# Patient Record
Sex: FEMALE | Race: WHITE | URBAN - METROPOLITAN AREA
[De-identification: names, ages, dates, MRNs, and addresses within clinical notes are randomized per-mention and may not be internally consistent; named-entity substitution may affect disease eponyms.]

---

## 2017-10-27 ENCOUNTER — TRANSFERRED RECORDS (OUTPATIENT)
Dept: HEALTH INFORMATION MANAGEMENT | Facility: CLINIC | Age: 67
End: 2017-10-27

## 2017-11-17 ENCOUNTER — MEDICAL CORRESPONDENCE (OUTPATIENT)
Dept: HEALTH INFORMATION MANAGEMENT | Facility: CLINIC | Age: 67
End: 2017-11-17

## 2017-12-04 ENCOUNTER — OFFICE VISIT (OUTPATIENT)
Dept: OPHTHALMOLOGY | Facility: CLINIC | Age: 67
End: 2017-12-04

## 2017-12-04 DIAGNOSIS — C44.111 BASAL CELL CARCINOMA OF SKIN OF LEFT EYELID, INCLUDING CANTHUS: Primary | ICD-10-CM

## 2017-12-04 ASSESSMENT — VISUAL ACUITY
OD_CC: 20/20
METHOD: SNELLEN - LINEAR
CORRECTION_TYPE: GLASSES
OD_CC+: -2
OS_CC: 20/20

## 2017-12-04 ASSESSMENT — TONOMETRY
OD_IOP_MMHG: 19
IOP_METHOD: ICARE
OS_IOP_MMHG: 20

## 2017-12-04 ASSESSMENT — CONF VISUAL FIELD
OS_NORMAL: 1
OD_NORMAL: 1
METHOD: COUNTING FINGERS

## 2017-12-04 ASSESSMENT — SLIT LAMP EXAM - LIDS: COMMENTS: MGD

## 2017-12-04 NOTE — PROGRESS NOTES
Chief Complaints and History of Present Illnesses   Patient presents with     Consult For     basal cell carcinoma     Here for evaluation of left medial canthal basal cell carcinoma. The biopsy was performed in October of this year.  She has not had any scans done.  She first noticed a lesion about 1 year ago.           Katherine Gordillo is a 67 year old female with the following diagnoses:   1. Basal cell carcinoma of skin of left eyelid, including canthus      Left medial canthal basal cell carcinoma  -appears to be involving caruncle as well, plan for CT with contrast to further assess depth.      PLAN:  Left medial canthal and anterior orbital excision of lesion with frozen sections and reconstruction with amniotic membrane grafting          Yolanda Leggett MD  Ophthalmic Plastic and Reconstructive Surgery Fellow    Attending Physician Attestation:  I have seen and examined this patient.  I have confirmed and edited as necessary the chief complaint(s), history of present illness, review of systems, relevant history, and examination findings as documented by others.  I have personally reviewed the relevant tests, images, and reports as documented above.  I have confirmed and edited as necessary the assessment and plan and agree with this note.    - Isaak John MD 2:55 PM 12/4/2017    Today with Katherine Gordillo, I reviewed the indications, risks, benefits, and alternatives of the proposed surgical procedure including, but not limited to, failure obtain the desired result  and need for additional surgery, bleeding, infection, loss of vision, loss of the eye, and the remote possibility of permanent damage to any organ system or death with the use of anesthesia.  I provided multiple opportunities for the questions, answered all questions to the best of my ability, and confirmed that my answers and my discussion were understood.   - Isaak John MD 3:18 PM 12/4/2017

## 2017-12-04 NOTE — PATIENT INSTRUCTIONS
Eyelid Tumors    The eyelid skin is the thinnest and most sensitive skin on your body. As a result, this is often the first area on your face to show change from sun damage and aging. Unfortunately, sun damage and other environmental toxins not only cause the skin to age but can cause serious damage. Skin cancer of the eyelids is relatively common and several types exist. The presence of a nodule or lesion on the eyelid that grows, bleed or ulcerates should be evaluated. This involves examination and sometimes a biopsy.    Basal Cell Carcinoma  Basal cell tumors represent the ninety percent of eyelid tumors. These skin cancers grow slowly over months and years. They most often appear as a pearly nodule that eventually starts to break down and ulcerate. Despite being a cancer, these tumors don't spread to distant areas but rather just continue to grow and infiltrate the surrounding tissue. They typically can be cured by simple excision followed by reconstruction of the defect left behind after the tumor removal.      Squamous Cell Carcinoma and Melanoma   These types of tumors occur much less common but are more aggressive and require more involved care to ensure complete treatment. Again, primary treatment involves removing the tumor, but care must also be taken to ensure the tumor has not spread anywhere, causing larger health problems. Your surgeon will help coordinate this as part of your treatment depending on the size and circumstances of the tumor at presentation.     Treatment     Skin cancer needs to be removed surgically by a skilled individual who can not only remove the tumor but reconstruct the eyelid or area where the tumor was removed. Sometimes, this will be done at a surgical facility with an on site pathologist who can immediately examine the specimen to ensure the whole tumor was removed. Other times, the help of a dermatologic surgeon specializing in Mohs surgical excision will be utilized. This  procedure is completed into two steps, the first in the dermatologist's office with immediate examination of the tumor to ensure its complete removal followed by the reconstructive surgery by Dr. John. Oculoplastic surgeons are the optimal person to repair these defects as the eyelid is quite delicate. Dr. John is a member of [employer] and is well-versed in the intricacies of the eyelid anatomy and the pitfalls associated with reconstructing this area.

## 2017-12-04 NOTE — LETTER
2017         RE:  :  MRN: Katherine Gordillo  1950  [id number]     Dear ,    Thank you for asking me to see your patient, Katherine Gordillo, for an oculoplastic   consultation.  My assessment and plan are below.  For further details, please see my attached clinic note.          Katherine Gordillo is a 67 year old female with the following diagnoses:   1. Basal cell carcinoma of skin of left eyelid, including canthus      Left medial canthal basal cell carcinoma  -appears to be involving caruncle as well, plan for CT with contrast to further assess depth.      PLAN:  Left medial canthal and anterior orbital excision of lesion with frozen sections and reconstruction with amniotic membrane grafting         Again, thank you for allowing me to participate in the care of your patient.      Sincerely,    Isaak John MD  Department of Ophthalmology and Visual Neurosciences  [facility]    CC: Whit Blue PA-C  [facility]  [address]  [place] [address]  VIA Facsimile: [phone]     ALIRIO PUTNAM  [facility]  [address]  [place] [address]  VIA Mail

## 2017-12-04 NOTE — NURSING NOTE
Chief Complaints and History of Present Illnesses   Patient presents with     Consult For     basal cell carcinoma     HPI    Affected eye(s):  Left   Symptoms:     No blurred vision   Tearing   Itching      Frequency:  Constant       Do you have eye pain now?:  No      Comments:  Pt states had a biopsy on a blood vein from left medial canthus that showed basal cell carcinoma. No pain. Brimonidine TID OU, Genteal TID OU. Ointment QHS OU.    Belem Combs COT 2:12 PM December 4, 2017

## 2017-12-04 NOTE — MR AVS SNAPSHOT
After Visit Summary   12/4/2017    Katherine Gordillo    MRN: [id number]           Patient Information     Date Of Birth          1950        Visit Information        Provider Department      12/4/2017 2:30 PM Isaak John MD [facility] Ophthalmology        Today's Diagnoses     Basal cell carcinoma of skin of left eyelid, including canthus    -  1      Care Instructions    Eyelid Tumors    The eyelid skin is the thinnest and most sensitive skin on your body. As a result, this is often the first area on your face to show change from sun damage and aging. Unfortunately, sun damage and other environmental toxins not only cause the skin to age but can cause serious damage. Skin cancer of the eyelids is relatively common and several types exist. The presence of a nodule or lesion on the eyelid that grows, bleed or ulcerates should be evaluated. This involves examination and sometimes a biopsy.    Basal Cell Carcinoma  Basal cell tumors represent the ninety percent of eyelid tumors. These skin cancers grow slowly over months and years. They most often appear as a pearly nodule that eventually starts to break down and ulcerate. Despite being a cancer, these tumors don't spread to distant areas but rather just continue to grow and infiltrate the surrounding tissue. They typically can be cured by simple excision followed by reconstruction of the defect left behind after the tumor removal.      Squamous Cell Carcinoma and Melanoma   These types of tumors occur much less common but are more aggressive and require more involved care to ensure complete treatment. Again, primary treatment involves removing the tumor, but care must also be taken to ensure the tumor has not spread anywhere, causing larger health problems. Your surgeon will help coordinate this as part of your treatment depending on the size and circumstances of the tumor at presentation.     Treatment     Skin cancer needs to be removed surgically  by a skilled individual who can not only remove the tumor but reconstruct the eyelid or area where the tumor was removed. Sometimes, this will be done at a surgical facility with an on site pathologist who can immediately examine the specimen to ensure the whole tumor was removed. Other times, the help of a dermatologic surgeon specializing in Mohs surgical excision will be utilized. This procedure is completed into two steps, the first in the dermatologist's office with immediate examination of the tumor to ensure its complete removal followed by the reconstructive surgery by Dr. John. Oculoplastic surgeons are the optimal person to repair these defects as the eyelid is quite delicate. Dr. John is a member of [employer] and is well-versed in the intricacies of the eyelid anatomy and the pitfalls associated with reconstructing this area.              Follow-ups after your visit        Your next 10 appointments already scheduled     Dec 04, 2017  4:40 PM CST   (Arrive by 4:25 PM)   CT TEMPORAL ORBITAL SELLA W CONTRAST with UCCT1   [facility] [place] [facility] ([facility])    [address] [address] [phone]           Please bring any scans or X-rays taken at other hospitals, if similar tests were done. Also bring a list of your medicines, including vitamins, minerals and over-the-counter drugs. It is safest to leave personal items at home.  Be sure to tell your doctor:   If you have any allergies.   If there s any chance you are pregnant.   If you are breastfeeding.   If you have any special needs.  You will have contrast for this exam. To prepare:   Do not eat or drink for 2 hours before your exam. If you need to take medicine, you may take it with small sips of water. (We may ask you to take liquid medicine as well.)   The day before your exam, drink extra fluids at least six 8-ounce glasses (unless your doctor tells you to restrict your fluids).   Patients over 70 or patients with diabetes or kidney problems:   If you haven t had a blood test (creatinine test) within the last 30 days, go to your clinic or Diagnostic Imaging Department for this test.  If you have diabetes:   If your kidney function is normal, continue taking your metformin (Avandamet, Glucophage, Glucovance, Metaglip) on the day of your exam.   If your kidney function is abnormal, wait 48 hours before restarting this medicine.  Please wear loose clothing, such as a sweat suit or jogging clothes. Avoid snaps, zippers and other metal. We may ask you to undress and put on a hospital gown.  If you have any questions, please call the Imaging Department where you will have your exam.              Future tests that were ordered for you today     Open Future Orders        Priority Expected Expires Ordered    CT Temporal Orbital Sella w Contrast Routine  12/4/2018 12/4/2017            Who to contact     Please call your clinic at [phone] to:    Ask questions about your health    Make or cancel appointments    Discuss your medicines    Learn about your test results    Speak to your doctor   If you have compliments or concerns about an experience at your clinic, or if you wish to file a complaint, please contact [facility] Patient Relations at [phone] or email us at [email or web address]         Additional Information About Your Visit        [employer] Information     [employer] gives you secure access to your electronic health record. If you see a primary care provider, you can also send messages to your care team and make appointments. If you have questions, please call your primary care clinic.  If you do not have a primary care provider, please call [phone] and they will assist you.      [employer] is an electronic gateway that provides easy, online access to your medical records. With [employer], you can request a clinic appointment, read your test results,  renew a prescription or communicate with your care team.     To access your existing account, please contact your [facility] or call [phone] for assistance.        Care EveryWhere ID     This is your Care EveryWhere ID. This could be used by other organizations to access your [facility] medical records  [id number]         Blood Pressure from Last 3 Encounters:   No data found for BP    Weight from Last 3 Encounters:   No data found for Wt              We Performed the Following     External Photos OU (both eyes)     Yahaira-Operative Worksheet (Plastics)        Primary Care Provider Office Phone # Fax #    Whit MELODY Blue PA-C [phone]       [facility] [address]  [place] [address]        Equal Access to Services     CELIA BETANCOURT : Hadii aad ku hadasho Soomaali, waaxda luqadaha, qaybta kaalmada adeegyada, waxay idiin hayveen alvaro rojas . So [facility] [phone].    ATENCIÓN: Si habla español, tiene a morton disposición servicios gratuitos de asistencia lingüística. [facility] [phone].    We comply with applicable federal civil rights laws and [place] laws. We do not discriminate on the basis of race, color, national origin, age, disability, sex, sexual orientation, or gender identity.            Thank you!     Thank you for choosing [facility] OPHTHALMOLOGY  for your care. Our goal is always to provide you with excellent care. Hearing back from our patients is one way we can continue to improve our services. Please take a few minutes to complete the written survey that you may receive in the mail after your visit with us. Thank you!             Your Updated Medication List - Protect others around you: Learn how to safely use, store and throw away your medicines at www.disposemymeds.org.          This list is accurate as of: 12/4/17  3:34 PM.  Always use your most recent med list.                   Brand Name Dispense Instructions for use  Diagnosis    BRIMONIDINE TARTRATE OP      Apply 1 drop to eye 3 times daily        TUMS PO      Take by mouth as needed        TYLENOL PO      Take by mouth as needed

## 2018-01-04 ENCOUNTER — TRANSFERRED RECORDS (OUTPATIENT)
Dept: HEALTH INFORMATION MANAGEMENT | Facility: CLINIC | Age: 68
End: 2018-01-04

## 2018-01-30 ENCOUNTER — ANESTHESIA EVENT (OUTPATIENT)
Dept: SURGERY | Facility: AMBULATORY SURGERY CENTER | Age: 68
End: 2018-01-30

## 2018-01-30 NOTE — ANESTHESIA PREPROCEDURE EVALUATION
Physical Exam  Normal systems: cardiovascular and pulmonary    Airway   Mallampati: II  TM distance: >3 FB  Neck ROM: full    Dental   (+) upper dentures and lower dentures    Cardiovascular   Rhythm and rate: regular and normal      Pulmonary    breath sounds clear to auscultation                    Anesthesia Plan      History & Physical Review  History and physical reviewed and following examination; no interval change.    ASA Status:  2 .    NPO Status:  > 8 hours    Plan for General and LMA with Propofol and Intravenous induction. Maintenance will be Balanced.    PONV prophylaxis:  Ondansetron (or other 5HT-3) and Dexamethasone or Solumedrol       Postoperative Care  Postoperative pain management:  IV analgesics, Oral pain medications and Multi-modal analgesia.      Consents  Anesthetic plan, risks, benefits and alternatives discussed with:  Patient..            ANESTHESIA PREOP EVALUATION    PROCEDURE: Procedure(s):  Left Medial Canthal and Anterior Orbital Excision of Lesion with Frozen Sections and Reconstruction with Amniotic Membrane Grafting - Wound Class:     HPI: Katherine Gordillo is a 67 year old female who presents for above procedure.    PAST MEDICAL HISTORY:    Past Medical History:   Diagnosis Date     Diabetes (H)        PAST SURGICAL HISTORY:    No past surgical history on file.    PAST ANESTHESIA HISTORY:     No personal or family h/o anesthesia problems    SOCIAL HISTORY:       Social History   Substance Use Topics     Smoking status: Never Smoker     Smokeless tobacco: Never Used     Alcohol use Not on file       ALLERGIES:     No Known Allergies    MEDICATIONS:       (Not in a hospital admission)    Current Outpatient Prescriptions   Medication Sig Dispense Refill     METFORMIN HCL PO Take 500 mg by mouth       VITAMIN D, CHOLECALCIFEROL, PO Take by mouth daily       SIMVASTATIN PO        Multiple Vitamins-Minerals (MULTIVITAMIN ADULT PO)        BRIMONIDINE TARTRATE OP Apply 1 drop to eye  3 times daily       Acetaminophen (TYLENOL PO) Take by mouth as needed       Calcium Carbonate Antacid (TUMS PO) Take by mouth as needed         Current Outpatient Prescriptions Ordered in [facility]   Medication Sig Dispense Refill     METFORMIN HCL PO Take 500 mg by mouth       VITAMIN D, CHOLECALCIFEROL, PO Take by mouth daily       SIMVASTATIN PO        Multiple Vitamins-Minerals (MULTIVITAMIN ADULT PO)        BRIMONIDINE TARTRATE OP Apply 1 drop to eye 3 times daily       Acetaminophen (TYLENOL PO) Take by mouth as needed       Calcium Carbonate Antacid (TUMS PO) Take by mouth as needed       No current Epic-ordered facility-administered medications on file.        PHYSICAL EXAM:    Vitals: T Data Unavailable, P Data Unavailable, BP Data Unavailable, R Data Unavailable, SpO2  , Weight Wt Readings from Last 2 Encounters:   No data found for Wt       See doc flowsheet      No Data Recorded    NPO STATUS: see doc flowsheet    LABS:    BMP:  No results for input(s): NA, POTASSIUM, CHLORIDE, CO2, BUN, CR, GLC, YAMILETH in the last 66380 hours.    LFTs:   No results for input(s): PROTTOTAL, ALBUMIN, BILITOTAL, ALKPHOS, AST, ALT, BILIDIRECT in the last 75444 hours.    CBC:   No results for input(s): WBC, RBC, HGB, HCT, MCV, MCH, MCHC, RDW, PLT in the last 65737 hours.    Coags:  No results for input(s): INR, PTT, FIBR in the last 65514 hours.    Imaging:  No orders to display       Dariel Hoyt MD  Anesthesiology Staff  Pager [phone]    1/30/2018  1:58 PM                  .

## 2018-01-31 ENCOUNTER — ANESTHESIA (OUTPATIENT)
Dept: SURGERY | Facility: AMBULATORY SURGERY CENTER | Age: 68
End: 2018-01-31

## 2018-01-31 ENCOUNTER — HOSPITAL ENCOUNTER (OUTPATIENT)
Facility: AMBULATORY SURGERY CENTER | Age: 68
End: 2018-01-31
Attending: OPHTHALMOLOGY
Payer: MEDICARE

## 2018-01-31 VITALS
OXYGEN SATURATION: 96 % | SYSTOLIC BLOOD PRESSURE: 110 MMHG | DIASTOLIC BLOOD PRESSURE: 72 MMHG | WEIGHT: 135 LBS | TEMPERATURE: 97.6 F | BODY MASS INDEX: 26.5 KG/M2 | HEART RATE: 92 BPM | RESPIRATION RATE: 18 BRPM | HEIGHT: 60 IN

## 2018-01-31 DIAGNOSIS — Z98.890 POSTOPERATIVE EYE STATE: Primary | ICD-10-CM

## 2018-01-31 LAB
GLUCOSE BLDC GLUCOMTR-MCNC: 121 MG/DL (ref 70–99)
GLUCOSE BLDC GLUCOMTR-MCNC: 136 MG/DL (ref 70–99)

## 2018-01-31 PROCEDURE — 88331 PATH CONSLTJ SURG 1 BLK 1SPC: CPT | Performed by: OPHTHALMOLOGY

## 2018-01-31 PROCEDURE — 88307 TISSUE EXAM BY PATHOLOGIST: CPT | Performed by: OPHTHALMOLOGY

## 2018-01-31 DEVICE — EYE IMP AMNIOTIC MEMBRANE 2.0X1.5CM AG-2015: Type: IMPLANTABLE DEVICE | Site: EYE | Status: FUNCTIONAL

## 2018-01-31 RX ORDER — GLYCOPYRROLATE 0.2 MG/ML
INJECTION, SOLUTION INTRAMUSCULAR; INTRAVENOUS PRN
Status: DISCONTINUED | OUTPATIENT
Start: 2018-01-31 | End: 2018-01-31

## 2018-01-31 RX ORDER — MEPERIDINE HYDROCHLORIDE 25 MG/ML
12.5 INJECTION INTRAMUSCULAR; INTRAVENOUS; SUBCUTANEOUS
Status: DISCONTINUED | OUTPATIENT
Start: 2018-01-31 | End: 2018-02-01 | Stop reason: HOSPADM

## 2018-01-31 RX ORDER — LIDOCAINE 40 MG/G
CREAM TOPICAL
Status: DISCONTINUED | OUTPATIENT
Start: 2018-01-31 | End: 2018-01-31 | Stop reason: HOSPADM

## 2018-01-31 RX ORDER — EPHEDRINE SULFATE 50 MG/ML
INJECTION, SOLUTION INTRAMUSCULAR; INTRAVENOUS; SUBCUTANEOUS PRN
Status: DISCONTINUED | OUTPATIENT
Start: 2018-01-31 | End: 2018-01-31

## 2018-01-31 RX ORDER — FENTANYL CITRATE 50 UG/ML
25-50 INJECTION, SOLUTION INTRAMUSCULAR; INTRAVENOUS
Status: DISCONTINUED | OUTPATIENT
Start: 2018-01-31 | End: 2018-01-31 | Stop reason: HOSPADM

## 2018-01-31 RX ORDER — LIDOCAINE HYDROCHLORIDE 20 MG/ML
INJECTION, SOLUTION INFILTRATION; PERINEURAL PRN
Status: DISCONTINUED | OUTPATIENT
Start: 2018-01-31 | End: 2018-01-31

## 2018-01-31 RX ORDER — FENTANYL CITRATE 50 UG/ML
INJECTION, SOLUTION INTRAMUSCULAR; INTRAVENOUS PRN
Status: DISCONTINUED | OUTPATIENT
Start: 2018-01-31 | End: 2018-01-31

## 2018-01-31 RX ORDER — NEOMYCIN SULFATE, POLYMYXIN B SULFATE AND DEXAMETHASONE 3.5; 10000; 1 MG/ML; [USP'U]/ML; MG/ML
1 SUSPENSION/ DROPS OPHTHALMIC 4 TIMES DAILY
Qty: 1 BOTTLE | Refills: 0 | Status: SHIPPED | OUTPATIENT
Start: 2018-01-31

## 2018-01-31 RX ORDER — FENTANYL CITRATE 50 UG/ML
25-50 INJECTION, SOLUTION INTRAMUSCULAR; INTRAVENOUS
Status: DISCONTINUED | OUTPATIENT
Start: 2018-01-31 | End: 2018-02-01 | Stop reason: HOSPADM

## 2018-01-31 RX ORDER — PROPOFOL 10 MG/ML
INJECTION, EMULSION INTRAVENOUS PRN
Status: DISCONTINUED | OUTPATIENT
Start: 2018-01-31 | End: 2018-01-31

## 2018-01-31 RX ORDER — LIDOCAINE HYDROCHLORIDE AND EPINEPHRINE 10; 10 MG/ML; UG/ML
INJECTION, SOLUTION INFILTRATION; PERINEURAL PRN
Status: DISCONTINUED | OUTPATIENT
Start: 2018-01-31 | End: 2018-01-31 | Stop reason: HOSPADM

## 2018-01-31 RX ORDER — SODIUM CHLORIDE, SODIUM LACTATE, POTASSIUM CHLORIDE, CALCIUM CHLORIDE 600; 310; 30; 20 MG/100ML; MG/100ML; MG/100ML; MG/100ML
INJECTION, SOLUTION INTRAVENOUS CONTINUOUS
Status: DISCONTINUED | OUTPATIENT
Start: 2018-01-31 | End: 2018-02-01 | Stop reason: HOSPADM

## 2018-01-31 RX ORDER — NALOXONE HYDROCHLORIDE 0.4 MG/ML
.1-.4 INJECTION, SOLUTION INTRAMUSCULAR; INTRAVENOUS; SUBCUTANEOUS
Status: DISCONTINUED | OUTPATIENT
Start: 2018-01-31 | End: 2018-02-01 | Stop reason: HOSPADM

## 2018-01-31 RX ORDER — ONDANSETRON 2 MG/ML
INJECTION INTRAMUSCULAR; INTRAVENOUS PRN
Status: DISCONTINUED | OUTPATIENT
Start: 2018-01-31 | End: 2018-01-31

## 2018-01-31 RX ORDER — SODIUM CHLORIDE, SODIUM LACTATE, POTASSIUM CHLORIDE, CALCIUM CHLORIDE 600; 310; 30; 20 MG/100ML; MG/100ML; MG/100ML; MG/100ML
500 INJECTION, SOLUTION INTRAVENOUS CONTINUOUS
Status: DISCONTINUED | OUTPATIENT
Start: 2018-01-31 | End: 2018-01-31 | Stop reason: HOSPADM

## 2018-01-31 RX ORDER — HYDROMORPHONE HYDROCHLORIDE 1 MG/ML
.3-.5 INJECTION, SOLUTION INTRAMUSCULAR; INTRAVENOUS; SUBCUTANEOUS EVERY 10 MIN PRN
Status: DISCONTINUED | OUTPATIENT
Start: 2018-01-31 | End: 2018-02-01 | Stop reason: HOSPADM

## 2018-01-31 RX ORDER — ERYTHROMYCIN 5 MG/G
OINTMENT OPHTHALMIC PRN
Status: DISCONTINUED | OUTPATIENT
Start: 2018-01-31 | End: 2018-01-31 | Stop reason: HOSPADM

## 2018-01-31 RX ORDER — HYDROCODONE BITARTRATE AND ACETAMINOPHEN 5; 325 MG/1; MG/1
1 TABLET ORAL EVERY 6 HOURS PRN
Qty: 10 TABLET | Refills: 0 | Status: SHIPPED | OUTPATIENT
Start: 2018-01-31

## 2018-01-31 RX ORDER — PROPOFOL 10 MG/ML
INJECTION, EMULSION INTRAVENOUS CONTINUOUS PRN
Status: DISCONTINUED | OUTPATIENT
Start: 2018-01-31 | End: 2018-01-31

## 2018-01-31 RX ORDER — ACETAMINOPHEN 325 MG/1
975 TABLET ORAL ONCE
Status: COMPLETED | OUTPATIENT
Start: 2018-01-31 | End: 2018-01-31

## 2018-01-31 RX ORDER — DEXAMETHASONE SODIUM PHOSPHATE 4 MG/ML
INJECTION, SOLUTION INTRA-ARTICULAR; INTRALESIONAL; INTRAMUSCULAR; INTRAVENOUS; SOFT TISSUE PRN
Status: DISCONTINUED | OUTPATIENT
Start: 2018-01-31 | End: 2018-01-31

## 2018-01-31 RX ORDER — ONDANSETRON 2 MG/ML
4 INJECTION INTRAMUSCULAR; INTRAVENOUS EVERY 30 MIN PRN
Status: DISCONTINUED | OUTPATIENT
Start: 2018-01-31 | End: 2018-02-01 | Stop reason: HOSPADM

## 2018-01-31 RX ORDER — PHENYLEPHRINE HYDROCHLORIDE 100 MG/ML
SOLUTION/ DROPS OPHTHALMIC PRN
Status: DISCONTINUED | OUTPATIENT
Start: 2018-01-31 | End: 2018-01-31 | Stop reason: HOSPADM

## 2018-01-31 RX ORDER — ONDANSETRON 4 MG/1
4 TABLET, ORALLY DISINTEGRATING ORAL EVERY 30 MIN PRN
Status: DISCONTINUED | OUTPATIENT
Start: 2018-01-31 | End: 2018-02-01 | Stop reason: HOSPADM

## 2018-01-31 RX ORDER — ERYTHROMYCIN 5 MG/G
OINTMENT OPHTHALMIC
Qty: 3.5 G | Refills: 0 | Status: SHIPPED | OUTPATIENT
Start: 2018-01-31

## 2018-01-31 RX ADMIN — PROPOFOL 150 MCG/KG/MIN: 10 INJECTION, EMULSION INTRAVENOUS at 09:22

## 2018-01-31 RX ADMIN — Medication 100 MCG: at 10:30

## 2018-01-31 RX ADMIN — GLYCOPYRROLATE 0.2 MG: 0.2 INJECTION, SOLUTION INTRAMUSCULAR; INTRAVENOUS at 09:59

## 2018-01-31 RX ADMIN — Medication 100 MCG: at 10:39

## 2018-01-31 RX ADMIN — PROPOFOL 40 MG: 10 INJECTION, EMULSION INTRAVENOUS at 09:22

## 2018-01-31 RX ADMIN — SODIUM CHLORIDE, SODIUM LACTATE, POTASSIUM CHLORIDE, CALCIUM CHLORIDE: 600; 310; 30; 20 INJECTION, SOLUTION INTRAVENOUS at 09:14

## 2018-01-31 RX ADMIN — FENTANYL CITRATE 50 MCG: 50 INJECTION, SOLUTION INTRAMUSCULAR; INTRAVENOUS at 09:30

## 2018-01-31 RX ADMIN — Medication 100 MCG: at 10:28

## 2018-01-31 RX ADMIN — EPHEDRINE SULFATE 5 MG: 50 INJECTION, SOLUTION INTRAMUSCULAR; INTRAVENOUS; SUBCUTANEOUS at 10:45

## 2018-01-31 RX ADMIN — PROPOFOL: 10 INJECTION, EMULSION INTRAVENOUS at 10:20

## 2018-01-31 RX ADMIN — EPHEDRINE SULFATE 5 MG: 50 INJECTION, SOLUTION INTRAMUSCULAR; INTRAVENOUS; SUBCUTANEOUS at 09:44

## 2018-01-31 RX ADMIN — Medication 200 MCG: at 11:26

## 2018-01-31 RX ADMIN — Medication 200 MCG: at 11:16

## 2018-01-31 RX ADMIN — Medication 100 MCG: at 10:45

## 2018-01-31 RX ADMIN — PROPOFOL 160 MG: 10 INJECTION, EMULSION INTRAVENOUS at 09:21

## 2018-01-31 RX ADMIN — PROPOFOL 100 MCG/KG/MIN: 10 INJECTION, EMULSION INTRAVENOUS at 11:03

## 2018-01-31 RX ADMIN — Medication 100 MCG: at 09:36

## 2018-01-31 RX ADMIN — ONDANSETRON 4 MG: 2 INJECTION INTRAMUSCULAR; INTRAVENOUS at 09:29

## 2018-01-31 RX ADMIN — Medication 100 MCG: at 11:05

## 2018-01-31 RX ADMIN — Medication 100 MCG: at 10:54

## 2018-01-31 RX ADMIN — EPHEDRINE SULFATE 5 MG: 50 INJECTION, SOLUTION INTRAMUSCULAR; INTRAVENOUS; SUBCUTANEOUS at 10:54

## 2018-01-31 RX ADMIN — Medication 100 MCG: at 10:34

## 2018-01-31 RX ADMIN — Medication 100 MCG: at 09:44

## 2018-01-31 RX ADMIN — EPHEDRINE SULFATE 5 MG: 50 INJECTION, SOLUTION INTRAMUSCULAR; INTRAVENOUS; SUBCUTANEOUS at 11:05

## 2018-01-31 RX ADMIN — LIDOCAINE HYDROCHLORIDE 60 MG: 20 INJECTION, SOLUTION INFILTRATION; PERINEURAL at 09:21

## 2018-01-31 RX ADMIN — ACETAMINOPHEN 975 MG: 325 TABLET ORAL at 07:34

## 2018-01-31 RX ADMIN — EPHEDRINE SULFATE 5 MG: 50 INJECTION, SOLUTION INTRAMUSCULAR; INTRAVENOUS; SUBCUTANEOUS at 11:16

## 2018-01-31 RX ADMIN — DEXAMETHASONE SODIUM PHOSPHATE 4 MG: 4 INJECTION, SOLUTION INTRA-ARTICULAR; INTRALESIONAL; INTRAMUSCULAR; INTRAVENOUS; SOFT TISSUE at 09:29

## 2018-01-31 NOTE — OP NOTE
Procedure Date: 01/31/2018      DATE OF PROCEDURE:  01/31/2018      PREOPERATIVE DIAGNOSIS:  Left medial canthal and anterior orbital mass.      POSTOPERATIVE DIAGNOSIS:  Left medial canthal and anterior orbital mass.      PROCEDURE PERFORMED:   1.  Left orbitotomy with excision of lesion.   2.  Left amniotic membrane graft.   3.  Left medial canthal reconstruction with adjacent tissue transfer.      SURGEON:  Isaak John MD      ASSISTANT:  Yolanda Leggett MD      ANESTHESIA:  General with local infiltration of 1% lidocaine with epinephrine.      COMPLICATIONS:  None.      ESTIMATED BLOOD LOSS:  Less than 5 mL.      SPECIMENS:  Left orbital lesion in formalin and left deep margin, frozen.      HISTORY AND INDICATIONS:  The patient presented with a biopsy proven basal cell carcinoma of left medial canthus.  Orbital imaging revealed anterior extension into the orbit adjacent to the medial rectus muscle.  After risks, benefits and alternatives to the proposed procedure were explained, informed consent was obtained.      DESCRIPTION OF PROCEDURE:  The patient was brought to the operating room and placed supine on the operating table.  General anesthesia was induced.  The area was infiltrated with local anesthetic.  She was prepped and draped in the typical sterile fashion for oculoplastic surgery.  Attention was directed to the left side.  Markings were made on the medial canthus surrounding the area of the nodular lesion.  Incision was made with a #15 blade on the skin.  This was then carried full thickness through the eyelids.  Hemostasis was obtained with monopolar cautery.  The conjunctiva portion of the lesion was then excised using Aviva scissors to remain outside the clearly defined clinical margins.  Hemostasis was obtained.  Dissection was carried into the anterior orbit using the Aviva scissors along the medial rectus muscle.  There was no involvement of the rectus itself.  However, it did extend  into the rectus sheath anteriorly.  Biopsy of the deep tissues was sent for frozen section and found to be negative.  The remainder of the lesion was then excised with Aviva scissors and was placed in formalin with a suture marking the medial canthal angle.  Hemostasis was again obtained.  Using amniotic membrane, the conjunctiva was reconstructed.  The membrane was placed over the palpebral and bulbar conjunctiva and secured in place with interrupted 8-0 Vicryl sutures.  The upper and lower eyelids were then secured to the medial canthal tendon after releasing the lateral canthus with a #15 blade and doing the lateral canthotomy with monopolar cautery.  This allowed us to close the lids to the posterior lacrimal crest.  The anterior skin defect was then closed with anterior advancement flaps, which were secured with interrupted buried 5-0 Vicryl sutures deep and 6-0 plain gut sutures on the skin.  Lateral canthal tissue was closed with deep buried 5-0 Vicryl sutures and 6-0 plain gut sutures on the skin.  Antibiotic ointment was applied to the incisions in the eye.  The patient tolerated the procedure well.  She was taken to recovery room in stable condition.         SHIRLEY PENA MD             D: 2018   T: 2018   MT: gilmer      Name:     JUAN ANTONIO BARNARD   MRN:      [id number]        Account:        [id number]   :      1950           Procedure Date: 2018      Document: [id number]

## 2018-01-31 NOTE — ANESTHESIA POSTPROCEDURE EVALUATION
Patient: Katherine L Wilz    Procedure(s):  Left Medial Canthal and Anterior Orbital Excision of Lesion with Frozen Sections and Reconstruction with Amniotic Membrane Grafting - Wound Class: I-Clean    Diagnosis:Basal Cell Carcinoma  Diagnosis Additional Information: No value filed.    Anesthesia Type:  General, LMA    Note:  Anesthesia Post Evaluation    Patient location during evaluation: PACU  Patient participation: Able to fully participate in evaluation  Level of consciousness: awake and alert  Pain management: adequate  Airway patency: patent  Cardiovascular status: acceptable  Respiratory status: acceptable  Hydration status: acceptable  PONV: none     Anesthetic complications: None          Last vitals:  Vitals:    01/31/18 1200 01/31/18 1215 01/31/18 1230   BP: 93/65 109/75 110/72   Pulse:      Resp: 19 18 18   Temp: 36.5  C (97.7  F)  36.4  C (97.6  F)   SpO2: 96% 96% 96%         Electronically Signed By: Dariel Hoyt MD  January 31st, 2018

## 2018-01-31 NOTE — ANESTHESIA CARE TRANSFER NOTE
Patient: Katherine L Wilz    Procedure(s):  Left Medial Canthal and Anterior Orbital Excision of Lesion with Frozen Sections and Reconstruction with Amniotic Membrane Grafting - Wound Class: I-Clean    Diagnosis: Basal Cell Carcinoma  Diagnosis Additional Information: No value filed.    Anesthesia Type:   General, LMA     Note:  Airway :Nasal Cannula  Patient transferred to:PACU  Comments: VSS, denies pain, no PONV, report to Supriya HUFFHandoff Report: Identifed the Patient, Identified the Reponsible Provider, Reviewed the pertinent medical history, Discussed the surgical course, Reviewed Intra-OP anesthesia mangement and issues during anesthesia, Set expectations for post-procedure period and Allowed opportunity for questions and acknowledgement of understanding      Vitals: (Last set prior to Anesthesia Care Transfer)    CRNA VITALS  1/31/2018 1109 - 1/31/2018 1144      1/31/2018             Pulse: 82    SpO2: 98 %    Resp Rate (observed): 12                Electronically Signed By: VARSHA Gaviria CRNA  January 31, 2018  11:44 AM

## 2018-01-31 NOTE — IP AVS SNAPSHOT
[facility]    [address] [place] [address]    Phone:  [phone]    Fax:  [phone]                                       After Visit Summary   1/31/2018    Katherine Gordillo    MRN: [id number]           After Visit Summary Signature Page     I have received my discharge instructions, and my questions have been answered. I have discussed any challenges I see with this plan with the nurse or doctor.    ..........................................................................................................................................  Patient/Patient Representative Signature      ..........................................................................................................................................  Patient Representative Print Name and Relationship to Patient    ..................................................               ................................................  Date                                            Time    ..........................................................................................................................................  Reviewed by Signature/Title    ...................................................              ..............................................  Date                                                            Time

## 2018-01-31 NOTE — IP AVS SNAPSHOT
MRN:[id number]                      After Visit Summary   1/31/2018    Katherine Gordillo    MRN: [id number]           Thank you!     Thank you for choosing [facility] for your care. Our goal is always to provide you with excellent care. Hearing back from our patients is one way we can continue to improve our services. Please take a few minutes to complete the written survey that you may receive in the mail after you visit with us. Thank you!        Patient Information     Date Of Birth          1950        About your hospital stay     You were admitted on:  January 31, 2018 [facility]    You were discharged on:  January 31, 2018       Who to Call     For medical emergencies, please call 911.  For non-urgent questions about your medical care, please call your primary care provider or clinic, [phone]  For questions related to your surgery, please call your surgery clinic        Attending Provider     Provider Specialty    Isaak John MD Ophthalmology       Primary Care Provider Office Phone # Fax #    Whit Blue PA-C [phone]      After Care Instructions     Discharge Medication Instructions       Do NOT take aspirin or medications containing NSAIDS for 72 hours after procedure.            Ice to affected area       Apply cold pack for 15 minutes on, 15 minutes off, for 48 hours while awake.                  Further instructions from your care team       Post-operative Instructions    Ophthalmic Plastic and Reconstructive Surgery  Isaak John M.D.  Moose Alcantar M.D.  Yolanda Leggett M.D.    All instructions apply to the operated eye(s) or eyelid(s)      What to expect after surgery:    There will be some swelling, bruising, and likely a black eye (even into the lower eyelids and cheeks). Also expect crusting and discharge from the eye and/or incisions.     A small amount of surface bleeding is  normal for the first 48 hours after surgery.    You may notice some bloody tears for the first few days after surgery. This is normal.    Your eye(s) and eyelid(s) may be painful and tender. This is normal after surgery. Use the pain medication as prescribed. If your pain does not improve despite the medication, contact the office.    Wound care and personal care:    If a patch or bandage has been placed, please leave this in place until seen in clinic. Prevent the bandage from getting wet.     Apply ice compresses 15 minutes on 15 minutes off while awake for the first 2 days after surgery, then switch to warm compresses 4 times a day until seen by your physician.     For warm packs you can place a cup of dry uncooked rice in a clean cotton sock. Place sock in microwave 30 seconds to one minute. Next place the warm sock into a plastic bag and wrap the bag with clean warm wet washcloth and place over operated eye.      You may shower or wash your hair the day after surgery. Do not bathe or go swimming for 1 week to prevent contamination of your wounds.    Do not apply make-up to the eyes or eyelids for 2 weeks after surgery.      Activity restrictions and driving:    Avoid heavy lifting, bending, exercise or strenuous activity for 1 week after surgery.    You may resume other activities and return to work as tolerated.    You may not resume driving until have you stopped using narcotic pain medications(such as Norco, Percocet, Tylenol #3).    Medications:    Restart all your regular home medications and eye drops today. If you take Plavix or Aspirin on a regular basis, wait for 3 days after your surgery before restarting these in order to decrease the risk of bleeding complications.    Avoid aspirin and aspirin-like medications (Motrin, Aleve, Ibuprofen, Veronica-Gillham etc) for 5 days to reduce the risk of bleeding. You may take Tylenol (acetaminophen) for pain.    In addition to your home medications, take the  following post-operative medications as prescribed by your physician:    Apply antibiotic ointment (erythromycin) to all sutures three times a day, and into the operated eye(s) at night.    Instill eye drops (Maxitrol) four times a day until the bottle finished.    Take 1 to 2 pain pills (norco or tylenol 3 as prescribed) as needed for pain up to every 4 hours.    The pain pills may make you drowsy. You must not drive a car, operate heavy machinery or drink alcohol while taking them.    The pain pills may cause constipation and nausea. Take them with some food to prevent a stomach upset. If you continue to experience nausea, call your physician.      WARNING: All the prescription pain medications listed above contain Tylenol (acetaminophen). You must not take more than 4,000 mg of acetaminophen per 24-hour period. This is equivalent to 6 tablets of Darvocet, 8 tablets of Vicodin, or 12 tablets of Norco, Percocet or Tylenol #3. If you take other over-the-counter medications containing acetaminophen, you must take the amount of acetaminophen into account and reduce the number of prescribed pain pills accordingly.    Contact information and follow-up:    Return to the Eye Clinic for a follow-up appointment with your physician as  scheduled. If no appointment has been scheduled, call [phone] for an  appointment with Dr. John within 1 to 2 weeks from your date of surgery.      For severe pain, bleeding, or loss of vision, call the Eye Clinic at [phone].    After hours or on weekends and holidays, call [phone] and ask to speak with the ophthalmologist on call.      [facility]  Home Care Following Anesthesia  For 24 hours after surgery:  1. Get plenty of rest.  A responsible adult must stay with you for at least 24 hours after you leave the surgery center.  2. Do not drive or use heavy equipment.  If you have weakness or tingling, don't drive or use heavy equipment  until this feeling goes away.   3. Do not drink alcohol.   4. Avoid strenuous or risky activities.  Ask for help when climbing stairs.  5. You may feel lightheaded.  IF so, sit for a few minutes before standing.  Have someone help you get up.   6. If you have nausea (feel sick to your stomach): Drink only clear liquids such as apple juice, ginger ale, broth or 7-Up.  Rest may also help.  Be sure to drink enough fluids.  Move to a regular diet as you feel able.   7. You may have a slight fever.  Call the doctor if your fever is over 100 F (37.7 C) (taken under the tongue) or lasts longer than 24 hours.  8. You may have a dry mouth, a sore throat, muscle aches or trouble sleeping. These should go away after 24 hours.  9. Do not make important or legal decisions.               Tips for taking pain medications  To get the best pain relief possible, remember these points:    Take pain medications as directed, before pain becomes severe.    Pain medication can upset your stomach: taking it with food may help.    Constipation is a common side effect of pain medication. Drink plenty of  fluids.    Eat foods high in fiber. Take a stool softener if recommended by your doctor or pharmacist.    Do not drink alcohol, drive or operate machinery while taking pain medications.    Ask about other ways to control pain, such as with heat, ice or relaxation.    Tylenol/Acetaminophen Consumption  To help encourage the safe use of acetaminophen, the makers of TYLENOL  have lowered the maximum daily dose for single-ingredient Extra Strength TYLENOL  (acetaminophen) products sold in the [place] from 8 pills per day (4,000 mg) to 6 pills per day (3,000 mg). The dosing interval has also changed from 2 pills every 4-6 hours to 2 pills every 6 hours.    If you feel your pain relief is insufficient, you may take Tylenol/Acetaminophen in addition to your narcotic pain medication.     Be careful not to exceed 3,000 mg of Tylenol/Acetaminophen in a  24 hour period from all sources.    If you are taking extra strength Tylenol/acetaminophen (500 mg), the maximum dose is 6 tablets in 24 hours.    If you are taking regular strength acetaminophen (325 mg), the maximum dose is 9 tablets in 24 hours.    Call a doctor for any of the followin. Signs of infection (fever, growing tenderness at the surgery site, a large amount of drainage or bleeding, severe pain, foul-smelling drainage, redness, swelling).  2. It has been over 8 to 10 hours since surgery and you are still not able to urinate (pass water).  3. Headache for over 24 hours.  Your doctor is:  Dr. Isaak John, Ophthalmology: [phone]  Or dial [phone] and ask for the resident on call for:  Orthopaedics  For emergency care, call the:  [facility] Emergency Department: [phone] (TTY for hearing impaired: [phone])      Pending Results     Date and Time Order Name Status Description    2018 0942 Surgical pathology exam Preliminary             Admission Information     Date & Time Provider Department Dept. Phone    2018 Isaak John MD [facility] [phone]      Your Vitals Were     Blood Pressure Pulse Respirations Height Weight Last Period    139/86 92 14 1.524 m (5') 61.2 kg (135 lb) (LMP Unknown)    Pulse Oximetry BMI (Body Mass Index)                95% 26.37 kg/m2          [employer] Information     [employer] gives you secure access to your electronic health record. If you see a primary care provider, you can also send messages to your care team and make appointments. If you have questions, please call your primary care clinic.  If you do not have a primary care provider, please call [phone] and they will assist you.      [employer] is an electronic gateway that provides easy, online access to your medical records. With [employer], you can request a clinic appointment, read your test results, renew a prescription or communicate with your care  team.     To access your existing account, please contact your [facility] or call [phone] for assistance.        Care EveryWhere ID     This is your Care EveryWhere ID. This could be used by other organizations to access your [facility] medical records  [id number]        Equal Access to Services     CELIA BETANCOURT AH: Hadii aad ku hadrominajewell Soomaali, waaxda luqadaha, qaybta kaalmada adeegyada, aubree mcleodcorwinlicha morton. So [facility] [phone].    ATENCIÓN: Si habla español, tiene a morton disposición servicios gratuitos de asistencia lingüística. Llame al [phone].    We comply with applicable federal civil rights laws and [place] laws. We do not discriminate on the basis of race, color, national origin, age, disability, sex, sexual orientation, or gender identity.               Review of your medicines      START taking        Dose / Directions    erythromycin ophthalmic ointment   Commonly known as:  ROMYCIN   Used for:  Postoperative eye state        Apply to skin incisions three times daily and into the eye at bedtime.   Quantity:  3.5 g   Refills:  0       HYDROcodone-acetaminophen 5-325 MG per tablet   Commonly known as:  NORCO   Used for:  Postoperative eye state        Dose:  1 tablet   Take 1 tablet by mouth every 6 hours as needed for pain Maximum of 4000 mg of acetaminophen in 24 hours.   Quantity:  10 tablet   Refills:  0       neomycin-polymyxin-dexamethasone 3.5-56548-3.1 Susp ophthalmic susp   Commonly known as:  MAXITROL   Used for:  Postoperative eye state        Dose:  1 drop   Place 1 drop Into the left eye 4 times daily   Quantity:  1 Bottle   Refills:  0         CONTINUE these medicines which have NOT CHANGED        Dose / Directions    BRIMONIDINE TARTRATE OP        Dose:  1 drop   Apply 1 drop to eye 3 times daily   Refills:  0       METFORMIN HCL PO        Dose:  500 mg   Take 500 mg by mouth   Refills:  0       MULTIVITAMIN ADULT PO         Refills:  0       SIMVASTATIN PO        Refills:  0       TUMS PO        Take by mouth as needed   Refills:  0       TYLENOL PO        Take by mouth as needed   Refills:  0       VITAMIN D (CHOLECALCIFEROL) PO        Take by mouth daily   Refills:  0            Where to get your medicines      These medications were sent to [place], [place] - [address]  [address], [facility] [address]    Hours:  TRANSPLANT PHONE NUMBER [phone] Phone:  [phone]     erythromycin ophthalmic ointment    neomycin-polymyxin-dexamethasone 3.5-84594-6.1 Susp ophthalmic susp         Some of these will need a paper prescription and others can be bought over the counter. Ask your nurse if you have questions.     Bring a paper prescription for each of these medications     HYDROcodone-acetaminophen 5-325 MG per tablet                Protect others around you: Learn how to safely use, store and throw away your medicines at www.disposemymeds.org.        Information about OPIOIDS     PRESCRIPTION OPIOIDS: WHAT YOU NEED TO KNOW    Prescription opioids can be used to help relieve moderate to severe pain and are often prescribed following a surgery or injury, or for certain health conditions. These medications can be an important part of treatment but also come with serious risks. It is important to work with your health care provider to make sure you are getting the safest, most effective care.    WHAT ARE THE RISKS AND SIDE EFFECTS OF OPIOID USE?  Prescription opioids carry serious risks of addiction and overdose, especially with prolonged use. An opioid overdose, often marked by slowed breathing can cause sudden death. The use of prescription opioids can have a number of side effects as well, even when taken as directed:      Tolerance - meaning you might need to take more of a medication for the same pain relief    Physical dependence - meaning you have symptoms of  withdrawal when a medication is stopped    Increased sensitivity to pain    Constipation    Nausea, vomiting, and dry mouth    Sleepiness and dizziness    Confusion    Depression    Low levels of testosterone that can result in lower sex drive, energy, and strength    Itching and sweating    RISKS ARE GREATER WITH:    History of drug misuse, substance use disorder, or overdose    Mental health conditions (such as depression or anxiety)    Sleep apnea    Older age (65 years or older)    Pregnancy    Avoid alcohol while taking prescription opioids.   Also, unless specifically advised by your health care provider, medications to avoid include:    Benzodiazepines (such as Xanax or Valium)    Muscle relaxants (such as Soma or Flexeril)    Hypnotics (such as Ambien or Lunesta)    Other prescription opioids    KNOW YOUR OPTIONS:  Talk to your health care provider about ways to manage your pain that do not involve prescription opioids. Some of these options may actually work better and have fewer risks and side effects:    Pain relievers such as acetaminophen, ibuprofen, and naproxen    Some medications that are also used for depression or seizures    Physical therapy and exercise    Cognitive behavioral therapy, a psychological, goal-directed approach, in which patients learn how to modify physical, behavioral, and emotional triggers of pain and stress    IF YOU ARE PRESCRIBED OPIOIDS FOR PAIN:    Never take opioids in greater amounts or more often than prescribed    Follow up with your primary health care provider and work together to create a plan on how to manage your pain.    Talk about ways to help manage your pain that do not involve prescription opioids    Talk about all concerns and side effects    Help prevent misuse and abuse    Never sell or share prescription opioids    Never use another person's prescription opioids    Store prescription opioids in a secure place and out of reach of others (this may include  visitors, children, friends, and family)    Visit www.cdc.gov/drugoverdose to learn about risks of opioid abuse and overdose    If you believe you may be struggling with addiction, tell your health care provider and ask for guidance or call [facility]'s National Helpline at 5-113-972-HELP    LEARN MORE / www.cdc.gov/drugoverdose/prescribing/guideline.html    Safely dispose of unused prescription opioids: Find your local drug take-back programs and more information about the importance of safe disposal at www.doseofreality.[place].gov             Medication List: This is a list of all your medications and when to take them. Check marks below indicate your daily home schedule. Keep this list as a reference.      Medications           Morning Afternoon Evening Bedtime As Needed    BRIMONIDINE TARTRATE OP   Apply 1 drop to eye 3 times daily                                erythromycin ophthalmic ointment   Commonly known as:  ROMYCIN   Apply to skin incisions three times daily and into the eye at bedtime.   Last time this was given:  2 inches on 1/31/2018 11:21 AM                                HYDROcodone-acetaminophen 5-325 MG per tablet   Commonly known as:  NORCO   Take 1 tablet by mouth every 6 hours as needed for pain Maximum of 4000 mg of acetaminophen in 24 hours.                                METFORMIN HCL PO   Take 500 mg by mouth                                MULTIVITAMIN ADULT PO                                neomycin-polymyxin-dexamethasone 3.5-90729-8.1 Susp ophthalmic susp   Commonly known as:  MAXITROL   Place 1 drop Into the left eye 4 times daily                                SIMVASTATIN PO                                TUMS PO   Take by mouth as needed                                TYLENOL PO   Take by mouth as needed   Last time this was given:  975 mg on 1/31/2018  7:34 AM                                VITAMIN D (CHOLECALCIFEROL) PO   Take by mouth daily

## 2018-01-31 NOTE — DISCHARGE INSTRUCTIONS
Post-operative Instructions    Ophthalmic Plastic and Reconstructive Surgery  Isaak John M.D.  Moose Alcantar M.D.  Yolanda Leggett M.D.    All instructions apply to the operated eye(s) or eyelid(s)      What to expect after surgery:    There will be some swelling, bruising, and likely a black eye (even into the lower eyelids and cheeks). Also expect crusting and discharge from the eye and/or incisions.     A small amount of surface bleeding is normal for the first 48 hours after surgery.    You may notice some bloody tears for the first few days after surgery. This is normal.    Your eye(s) and eyelid(s) may be painful and tender. This is normal after surgery. Use the pain medication as prescribed. If your pain does not improve despite the medication, contact the office.    Wound care and personal care:    If a patch or bandage has been placed, please leave this in place until seen in clinic. Prevent the bandage from getting wet.     Apply ice compresses 15 minutes on 15 minutes off while awake for the first 2 days after surgery, then switch to warm compresses 4 times a day until seen by your physician.     For warm packs you can place a cup of dry uncooked rice in a clean cotton sock. Place sock in microwave 30 seconds to one minute. Next place the warm sock into a plastic bag and wrap the bag with clean warm wet washcloth and place over operated eye.      You may shower or wash your hair the day after surgery. Do not bathe or go swimming for 1 week to prevent contamination of your wounds.    Do not apply make-up to the eyes or eyelids for 2 weeks after surgery.      Activity restrictions and driving:    Avoid heavy lifting, bending, exercise or strenuous activity for 1 week after surgery.    You may resume other activities and return to work as tolerated.    You may not resume driving until have you stopped using narcotic pain medications(such as Norco, Percocet, Tylenol  #3).    Medications:    Restart all your regular home medications and eye drops today. If you take Plavix or Aspirin on a regular basis, wait for 3 days after your surgery before restarting these in order to decrease the risk of bleeding complications.    Avoid aspirin and aspirin-like medications (Motrin, Aleve, Ibuprofen, Veronica-Sanford etc) for 5 days to reduce the risk of bleeding. You may take Tylenol (acetaminophen) for pain.    In addition to your home medications, take the following post-operative medications as prescribed by your physician:    Apply antibiotic ointment (erythromycin) to all sutures three times a day, and into the operated eye(s) at night.    Instill eye drops (Maxitrol) four times a day until the bottle finished.    Take 1 to 2 pain pills (norco or tylenol 3 as prescribed) as needed for pain up to every 4 hours.    The pain pills may make you drowsy. You must not drive a car, operate heavy machinery or drink alcohol while taking them.    The pain pills may cause constipation and nausea. Take them with some food to prevent a stomach upset. If you continue to experience nausea, call your physician.      WARNING: All the prescription pain medications listed above contain Tylenol (acetaminophen). You must not take more than 4,000 mg of acetaminophen per 24-hour period. This is equivalent to 6 tablets of Darvocet, 8 tablets of Vicodin, or 12 tablets of Norco, Percocet or Tylenol #3. If you take other over-the-counter medications containing acetaminophen, you must take the amount of acetaminophen into account and reduce the number of prescribed pain pills accordingly.    Contact information and follow-up:    Return to the [facility] for a follow-up appointment with your physician as  scheduled. If no appointment has been scheduled, call [phone] for an  appointment with Dr. John within 1 to 2 weeks from your date of surgery.      For severe pain, bleeding, or loss of vision, call the Eye  Clinic at [phone].    After hours or on weekends and holidays, call [phone] and ask to speak with the ophthalmologist on call.      [facility]  Home Care Following Anesthesia  For 24 hours after surgery:  1. Get plenty of rest.  A responsible adult must stay with you for at least 24 hours after you leave the surgery center.  2. Do not drive or use heavy equipment.  If you have weakness or tingling, don't drive or use heavy equipment until this feeling goes away.   3. Do not drink alcohol.   4. Avoid strenuous or risky activities.  Ask for help when climbing stairs.  5. You may feel lightheaded.  IF so, sit for a few minutes before standing.  Have someone help you get up.   6. If you have nausea (feel sick to your stomach): Drink only clear liquids such as apple juice, ginger ale, broth or 7-Up.  Rest may also help.  Be sure to drink enough fluids.  Move to a regular diet as you feel able.   7. You may have a slight fever.  Call the doctor if your fever is over 100 F (37.7 C) (taken under the tongue) or lasts longer than 24 hours.  8. You may have a dry mouth, a sore throat, muscle aches or trouble sleeping. These should go away after 24 hours.  9. Do not make important or legal decisions.               Tips for taking pain medications  To get the best pain relief possible, remember these points:    Take pain medications as directed, before pain becomes severe.    Pain medication can upset your stomach: taking it with food may help.    Constipation is a common side effect of pain medication. Drink plenty of  fluids.    Eat foods high in fiber. Take a stool softener if recommended by your doctor or pharmacist.    Do not drink alcohol, drive or operate machinery while taking pain medications.    Ask about other ways to control pain, such as with heat, ice or relaxation.    Tylenol/Acetaminophen Consumption  To help encourage the safe use of acetaminophen, the makers of  TYLENOL  have lowered the maximum daily dose for single-ingredient Extra Strength TYLENOL  (acetaminophen) products sold in the [place] from 8 pills per day (4,000 mg) to 6 pills per day (3,000 mg). The dosing interval has also changed from 2 pills every 4-6 hours to 2 pills every 6 hours.    If you feel your pain relief is insufficient, you may take Tylenol/Acetaminophen in addition to your narcotic pain medication.     Be careful not to exceed 3,000 mg of Tylenol/Acetaminophen in a 24 hour period from all sources.    If you are taking extra strength Tylenol/acetaminophen (500 mg), the maximum dose is 6 tablets in 24 hours.    If you are taking regular strength acetaminophen (325 mg), the maximum dose is 9 tablets in 24 hours.    Call a doctor for any of the followin. Signs of infection (fever, growing tenderness at the surgery site, a large amount of drainage or bleeding, severe pain, foul-smelling drainage, redness, swelling).  2. It has been over 8 to 10 hours since surgery and you are still not able to urinate (pass water).  3. Headache for over 24 hours.  Your doctor is:  Dr. Isaak John, Ophthalmology: [phone]  Or dial [phone] and ask for the resident on call for:  Orthopaedics  For emergency care, call the:  [facility] [facility]: [phone] (TTY for hearing impaired: [phone])

## 2018-02-01 ENCOUNTER — TELEPHONE (OUTPATIENT)
Dept: OPHTHALMOLOGY | Facility: CLINIC | Age: 68
End: 2018-02-01

## 2018-02-01 LAB — COPATH REPORT: NORMAL

## 2018-02-01 NOTE — TELEPHONE ENCOUNTER
Telephone call to Katherine Gordillo    Doing well with no pain, good vision, and no bleeding. All questions were answered, she is doing well, and postoperative care was reviewed.  A postop appointment has been scheduled.    Yolanda Leggett MD

## 2018-02-09 ENCOUNTER — TELEPHONE (OUTPATIENT)
Dept: OPHTHALMOLOGY | Facility: CLINIC | Age: 68
End: 2018-02-09

## 2018-02-09 NOTE — TELEPHONE ENCOUNTER
Notes faxed to referring MD/clinic 2-9-18    Left message with clinic stating will have Dr. John's nurse f/u next week to review plan for post-op    Note to dr. John's RN  Braden Owens RN 8:50 AM 02/09/18

## 2018-02-09 NOTE — TELEPHONE ENCOUNTER
----- Message from Gail Samano sent at 2/9/2018  8:32 AM CST -----  Regarding: Rose Marie from [facility] in [place], [place] is requesting ...  Contact: [phone]  Appt notes from Dr. John on this pt.  Please fax to # [phone].  And, is this pt supposed to have any f/u appts with Dr. John?    Call Rose Marie if need be at [phone].    Thank you,  David JOHN    Please DO NOT send this message and/or reply back to sender.  Call Center Representatives DO NOT respond to messages.

## 2020-03-11 ENCOUNTER — HEALTH MAINTENANCE LETTER (OUTPATIENT)
Age: 70
End: 2020-03-11

## 2020-12-27 ENCOUNTER — HEALTH MAINTENANCE LETTER (OUTPATIENT)
Age: 70
End: 2020-12-27

## 2021-04-25 ENCOUNTER — HEALTH MAINTENANCE LETTER (OUTPATIENT)
Age: 71
End: 2021-04-25

## 2021-10-09 ENCOUNTER — HEALTH MAINTENANCE LETTER (OUTPATIENT)
Age: 71
End: 2021-10-09

## 2022-03-26 ENCOUNTER — HEALTH MAINTENANCE LETTER (OUTPATIENT)
Age: 72
End: 2022-03-26

## 2022-05-21 ENCOUNTER — HEALTH MAINTENANCE LETTER (OUTPATIENT)
Age: 72
End: 2022-05-21

## 2022-09-17 ENCOUNTER — HEALTH MAINTENANCE LETTER (OUTPATIENT)
Age: 72
End: 2022-09-17

## 2023-06-04 ENCOUNTER — HEALTH MAINTENANCE LETTER (OUTPATIENT)
Age: 73
End: 2023-06-04

## (undated) DEVICE — GLOVE PROTEXIS MICRO 7.5  2D73PM75

## (undated) DEVICE — ESU CORD BIPOLAR GREEN 10-4000

## (undated) DEVICE — SUCTION MANIFOLD NEPTUNE 2 SYS 4 PORT 0702-020-000

## (undated) DEVICE — SPECIMEN CONTAINER 5OZ STERILE 2600SA

## (undated) DEVICE — LINER SUCTION US ASPIRATOR SONOPET CANISTER LF 5450-850-002

## (undated) DEVICE — ESU PENCIL W/COATED BLADE E2450H

## (undated) DEVICE — PACK MINOR EYE CUSTOM ASC

## (undated) DEVICE — EYE PREP BETADINE 5% SOLUTION 30ML 0065-0411-30

## (undated) DEVICE — SU VICRYL 5-0 P-3 18" UND J493H

## (undated) DEVICE — SU VICRYL 5-0 P-3 18" UND J493G

## (undated) DEVICE — LINEN TOWEL PACK X5 5464

## (undated) DEVICE — SU PLAIN 6-0 G-1DA 18" 770G

## (undated) DEVICE — ESU EYE HIGH TEMP 65410-183

## (undated) DEVICE — SU VICRYL 8-0 TG160-8 18" J547G

## (undated) DEVICE — SYR 03ML LL W/O NDL 309657

## (undated) DEVICE — SOL NACL 0.9% INJ 1000ML BAG 2B1324X

## (undated) DEVICE — APPLICATOR COTTON TIP 3" PKG OF 10 34831010

## (undated) DEVICE — NDL 30GA 0.5" 305106

## (undated) DEVICE — ESU GROUND PAD ADULT W/CORD E7507

## (undated) DEVICE — SOL NACL 0.9% IRRIG 500ML BOTTLE 2F7123

## (undated) DEVICE — DRSG GAUZE 4X4" TRAY 6939

## (undated) DEVICE — SPONGE SURGIFOAM 100 1974

## (undated) DEVICE — DRSG TELFA 3X8" 1238

## (undated) DEVICE — PEN MARKING SKIN VISIMARK 1424SR

## (undated) DEVICE — BLADE KNIFE SURG 15 371115

## (undated) DEVICE — EYE SPONGE SPEAR WECK CEL 0008685

## (undated) DEVICE — ESU NDL COLORADO MICRO 3CM STR N103A

## (undated) DEVICE — TUBING SUCTION 12"X1/4" N612

## (undated) DEVICE — SOL WATER IRRIG 500ML BOTTLE 2F7113

## (undated) RX ORDER — ERYTHROMYCIN 5 MG/G
OINTMENT OPHTHALMIC
Status: DISPENSED
Start: 2018-01-31

## (undated) RX ORDER — LIDOCAINE HYDROCHLORIDE 10 MG/ML
INJECTION, SOLUTION EPIDURAL; INFILTRATION; INTRACAUDAL; PERINEURAL
Status: DISPENSED
Start: 2018-01-31

## (undated) RX ORDER — PHENYLEPHRINE HYDROCHLORIDE 25 MG/ML
SOLUTION/ DROPS OPHTHALMIC
Status: DISPENSED
Start: 2018-01-31

## (undated) RX ORDER — ONDANSETRON 2 MG/ML
INJECTION INTRAMUSCULAR; INTRAVENOUS
Status: DISPENSED
Start: 2018-01-31

## (undated) RX ORDER — PHENYLEPHRINE HCL IN 0.9% NACL 1 MG/10 ML
SYRINGE (ML) INTRAVENOUS
Status: DISPENSED
Start: 2018-01-31

## (undated) RX ORDER — EPINEPHRINE 1 MG/ML
INJECTION, SOLUTION, CONCENTRATE INTRAVENOUS
Status: DISPENSED
Start: 2018-01-31

## (undated) RX ORDER — DEXAMETHASONE SODIUM PHOSPHATE 4 MG/ML
INJECTION, SOLUTION INTRA-ARTICULAR; INTRALESIONAL; INTRAMUSCULAR; INTRAVENOUS; SOFT TISSUE
Status: DISPENSED
Start: 2018-01-31

## (undated) RX ORDER — ACETAMINOPHEN 325 MG/1
TABLET ORAL
Status: DISPENSED
Start: 2018-01-31

## (undated) RX ORDER — PROPOFOL 10 MG/ML
INJECTION, EMULSION INTRAVENOUS
Status: DISPENSED
Start: 2018-01-31

## (undated) RX ORDER — PHENYLEPHRINE HYDROCHLORIDE 100 MG/ML
SOLUTION/ DROPS OPHTHALMIC
Status: DISPENSED
Start: 2018-01-31

## (undated) RX ORDER — GLYCOPYRROLATE 0.2 MG/ML
INJECTION INTRAMUSCULAR; INTRAVENOUS
Status: DISPENSED
Start: 2018-01-31

## (undated) RX ORDER — LIDOCAINE HYDROCHLORIDE 20 MG/ML
INJECTION, SOLUTION EPIDURAL; INFILTRATION; INTRACAUDAL; PERINEURAL
Status: DISPENSED
Start: 2018-01-31

## (undated) RX ORDER — FENTANYL CITRATE 50 UG/ML
INJECTION, SOLUTION INTRAMUSCULAR; INTRAVENOUS
Status: DISPENSED
Start: 2018-01-31

## (undated) RX ORDER — EPHEDRINE SULFATE 50 MG/ML
INJECTION, SOLUTION INTRAMUSCULAR; INTRAVENOUS; SUBCUTANEOUS
Status: DISPENSED
Start: 2018-01-31

## (undated) RX ORDER — BUPIVACAINE HYDROCHLORIDE 5 MG/ML
INJECTION, SOLUTION EPIDURAL; INTRACAUDAL
Status: DISPENSED
Start: 2018-01-31